# Patient Record
Sex: FEMALE | Race: WHITE | NOT HISPANIC OR LATINO
[De-identification: names, ages, dates, MRNs, and addresses within clinical notes are randomized per-mention and may not be internally consistent; named-entity substitution may affect disease eponyms.]

---

## 2017-09-27 PROBLEM — Z00.00 ENCOUNTER FOR PREVENTIVE HEALTH EXAMINATION: Status: ACTIVE | Noted: 2017-09-27

## 2022-10-14 ENCOUNTER — APPOINTMENT (OUTPATIENT)
Dept: ULTRASOUND IMAGING | Facility: CLINIC | Age: 52
End: 2022-10-14

## 2022-10-14 ENCOUNTER — APPOINTMENT (OUTPATIENT)
Dept: MAMMOGRAPHY | Facility: CLINIC | Age: 52
End: 2022-10-14

## 2022-10-31 ENCOUNTER — APPOINTMENT (OUTPATIENT)
Dept: ORTHOPEDIC SURGERY | Facility: CLINIC | Age: 52
End: 2022-10-31

## 2022-10-31 VITALS — HEIGHT: 67 IN | WEIGHT: 180 LBS | BODY MASS INDEX: 28.25 KG/M2

## 2022-10-31 DIAGNOSIS — M06.9 RHEUMATOID ARTHRITIS, UNSPECIFIED: ICD-10-CM

## 2022-10-31 DIAGNOSIS — Z78.9 OTHER SPECIFIED HEALTH STATUS: ICD-10-CM

## 2022-10-31 PROCEDURE — 73110 X-RAY EXAM OF WRIST: CPT | Mod: LT

## 2022-10-31 PROCEDURE — 99203 OFFICE O/P NEW LOW 30 MIN: CPT | Mod: 25

## 2022-10-31 PROCEDURE — 20550 NJX 1 TENDON SHEATH/LIGAMENT: CPT | Mod: LT

## 2022-10-31 NOTE — HISTORY OF PRESENT ILLNESS
[10] : 10 [9] : 9 [Sharp] : sharp [Constant] : constant [Nothing helps with pain getting better] : Nothing helps with pain getting better [de-identified] : 51yo RHD F with left dorsal and ulnar-sided wrist pain for the past month with no injury. 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 She was seen for this issue by her rheumatologist 2 weeks ago and was given a CSI in the dorsal wrist that did not provide any relief. She has tried a brace she order from [employer], but it seemed to aggravate her symptoms. Pain is affecting her sleep. 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  [] : no [FreeTextEntry1] : left wrist  [FreeTextEntry3] : 1 month  [FreeTextEntry5] : no injury has occurred  [de-identified] : 2009 lt thumb sx

## 2022-10-31 NOTE — PROCEDURE
[Tendon Sheath] : tendon sheath [Left] : of the left [Pain] : pain [Inflammation] : inflammation [Alcohol] : alcohol [Ethyl Chloride sprayed topically] : ethyl chloride sprayed topically [Sterile technique used] : sterile technique used [___ cc    1%] : Lidocaine ~Vcc of 1%  [___ cc    10mg] : Triamcinolone (Kenalog) ~Vcc of 10 mg  [] : Patient tolerated procedure well [Call if redness, pain or fever occur] : call if redness, pain or fever occur [Apply ice for 15min out of every hour for the next 12-24 hours as tolerated] : apply ice for 15 minutes out of every hour for the next 12-24 hours as tolerated [Patient was advised to rest the joint(s) for ____ days] : patient was advised to rest the joint(s) for [unfilled] days [Previous OTC use and PT nontherapeutic] : patient has tried OTC's including aspirin, Ibuprofen, Aleve, etc or prescription NSAIDS, and/or exercises at home and/or physical therapy without satisfactory response [Risks, benefits, alternatives discussed / Verbal consent obtained] : the risks benefits, and alternatives have been discussed, and verbal consent was obtained [de-identified] : AUGUSTU

## 2022-10-31 NOTE — PHYSICAL EXAM
[Left] : left hand [Dorsal Wrist] : dorsal wrist [TFCC] : TFCC [UJ] : UJ [] : good capillary refill in all fingers [de-identified] : ECU tenderness  [de-identified] : Pain with ECU activation

## 2022-11-28 ENCOUNTER — APPOINTMENT (OUTPATIENT)
Dept: ORTHOPEDIC SURGERY | Facility: CLINIC | Age: 52
End: 2022-11-28

## 2022-11-28 VITALS — WEIGHT: 180 LBS | BODY MASS INDEX: 28.25 KG/M2 | HEIGHT: 67 IN

## 2022-11-28 DIAGNOSIS — M77.8 OTHER ENTHESOPATHIES, NOT ELSEWHERE CLASSIFIED: ICD-10-CM

## 2022-11-28 DIAGNOSIS — M25.532 PAIN IN LEFT WRIST: ICD-10-CM

## 2022-11-28 PROCEDURE — 99213 OFFICE O/P EST LOW 20 MIN: CPT

## 2022-11-28 NOTE — HISTORY OF PRESENT ILLNESS
[2] : 2 [6] : 6 [Full time] : Work status: full time [de-identified] : 52 year old female followed for LEFT ECU tendonitis.  She is 1 months s/p injection and reports the injection was very helpful, but that she has episodes of severe shooting pain on the ulnar aspect of her wrist.  She has pain when playing the piano.   [de-identified] : none

## 2022-11-28 NOTE — DISCUSSION/SUMMARY
[Medication Risks Reviewed] : Medication risks reviewed [de-identified] : Considering injection in DRUJ.  She will assess her status over the next 6 weeks.  WIll reassess DRUJ vs ECU on her return.  Wean from the splint.

## 2022-11-28 NOTE — PHYSICAL EXAM
[Left] : left hand [Dorsal Wrist] : dorsal wrist [TFCC] : TFCC [UJ] : UJ [] : good capillary refill in all fingers [de-identified] : ECU tenderness is less.  DRUJ tender.  No pain with DRUJ loading [de-identified] : Pain with ECU activation

## 2022-12-10 ENCOUNTER — OFFICE (OUTPATIENT)
Dept: URBAN - METROPOLITAN AREA CLINIC 12 | Facility: CLINIC | Age: 52
Setting detail: OPHTHALMOLOGY
End: 2022-12-10
Payer: COMMERCIAL

## 2022-12-10 DIAGNOSIS — H16.223: ICD-10-CM

## 2022-12-10 DIAGNOSIS — M06.9: ICD-10-CM

## 2022-12-10 DIAGNOSIS — H43.393: ICD-10-CM

## 2022-12-10 DIAGNOSIS — H40.013: ICD-10-CM

## 2022-12-10 PROCEDURE — 92083 EXTENDED VISUAL FIELD XM: CPT | Performed by: OPHTHALMOLOGY

## 2022-12-10 PROCEDURE — 92133 CPTRZD OPH DX IMG PST SGM ON: CPT | Performed by: OPHTHALMOLOGY

## 2022-12-10 PROCEDURE — 92014 COMPRE OPH EXAM EST PT 1/>: CPT | Performed by: OPHTHALMOLOGY

## 2022-12-10 ASSESSMENT — SPHEQUIV_DERIVED
OD_SPHEQUIV: -0.375
OS_SPHEQUIV: -0.5
OD_SPHEQUIV: 0

## 2022-12-10 ASSESSMENT — REFRACTION_MANIFEST
OS_AXIS: 000
OS_AXIS: 115
OD_CYLINDER: -0.25
OD_VA1: 20/20
OD_CYLINDER: SPH
OS_VA1: 20/20
OD_ADD: +1.50
OS_VA1: 20/20
OD_AXIS: 097
OS_CYLINDER: -0.50
OS_ADD: +1.50
OD_AXIS: 000
OD_SPHERE: PLANO
OS_SPHERE: PLANO
OD_SPHERE: -0.25
OD_VA1: 20/20
OS_CYLINDER: SPH
OS_SPHERE: -0.25

## 2022-12-10 ASSESSMENT — KERATOMETRY
OS_K1POWER_DIOPTERS: 42.25
OS_AXISANGLE_DEGREES: 078
OD_AXISANGLE_DEGREES: 092
OD_K1POWER_DIOPTERS: 42.75
OS_K2POWER_DIOPTERS: 43.25
OD_K2POWER_DIOPTERS: 43.25

## 2022-12-10 ASSESSMENT — REFRACTION_CURRENTRX
OS_ADD: +1.50
OS_ADD: +1.25
OD_OVR_VA: 20/
OD_ADD: +1.25
OS_VPRISM_DIRECTION: SV
OD_ADD: +1.50
OS_OVR_VA: 20/
OD_VPRISM_DIRECTION: SV
OS_VPRISM_DIRECTION: SV
OS_OVR_VA: 20/
OD_OVR_VA: 20/
OD_VPRISM_DIRECTION: SV

## 2022-12-10 ASSESSMENT — PACHYMETRY
OS_CT_UM: 586
OD_CT_CORRECTION: -3
OS_CT_CORRECTION: -3
OD_CT_UM: 586

## 2022-12-10 ASSESSMENT — AXIALLENGTH_DERIVED
OD_AL: 23.777
OD_AL: 23.9263
OS_AL: 24.0718

## 2022-12-10 ASSESSMENT — REFRACTION_AUTOREFRACTION
OD_SPHERE: +0.25
OD_AXIS: 077
OS_CYLINDER: -0.50
OD_CYLINDER: -0.50
OS_SPHERE: PLANO
OS_AXIS: 123

## 2022-12-10 ASSESSMENT — TONOMETRY
OD_IOP_MMHG: 18
OS_IOP_MMHG: 20

## 2022-12-10 ASSESSMENT — VISUAL ACUITY
OD_BCVA: 20/20-1
OS_BCVA: 20/20

## 2022-12-10 ASSESSMENT — CONFRONTATIONAL VISUAL FIELD TEST (CVF)
OD_FINDINGS: FULL
OS_FINDINGS: FULL

## 2022-12-10 ASSESSMENT — SUPERFICIAL PUNCTATE KERATITIS (SPK)
OD_SPK: 1+
OS_SPK: T

## 2023-01-09 ENCOUNTER — APPOINTMENT (OUTPATIENT)
Dept: ORTHOPEDIC SURGERY | Facility: CLINIC | Age: 53
End: 2023-01-09

## 2023-01-16 ENCOUNTER — APPOINTMENT (OUTPATIENT)
Dept: ORTHOPEDIC SURGERY | Facility: CLINIC | Age: 53
End: 2023-01-16

## 2023-07-14 ENCOUNTER — OFFICE (OUTPATIENT)
Dept: URBAN - METROPOLITAN AREA CLINIC 12 | Facility: CLINIC | Age: 53
Setting detail: OPHTHALMOLOGY
End: 2023-07-14
Payer: COMMERCIAL

## 2023-07-14 DIAGNOSIS — H16.223: ICD-10-CM

## 2023-07-14 DIAGNOSIS — H40.013: ICD-10-CM

## 2023-07-14 DIAGNOSIS — H35.033: ICD-10-CM

## 2023-07-14 DIAGNOSIS — H43.393: ICD-10-CM

## 2023-07-14 DIAGNOSIS — H35.61: ICD-10-CM

## 2023-07-14 DIAGNOSIS — M06.9: ICD-10-CM

## 2023-07-14 PROCEDURE — 92250 FUNDUS PHOTOGRAPHY W/I&R: CPT | Performed by: OPHTHALMOLOGY

## 2023-07-14 PROCEDURE — 92014 COMPRE OPH EXAM EST PT 1/>: CPT | Performed by: OPHTHALMOLOGY

## 2023-07-14 ASSESSMENT — REFRACTION_CURRENTRX
OS_VPRISM_DIRECTION: SV
OS_VPRISM_DIRECTION: SV
OD_OVR_VA: 20/
OS_ADD: +1.50
OS_OVR_VA: 20/
OD_VPRISM_DIRECTION: SV
OD_VPRISM_DIRECTION: SV
OD_ADD: +1.25
OD_ADD: +1.50
OD_OVR_VA: 20/
OS_OVR_VA: 20/
OS_ADD: +1.25

## 2023-07-14 ASSESSMENT — VISUAL ACUITY
OD_BCVA: 20/20
OS_BCVA: 20/20

## 2023-07-14 ASSESSMENT — REFRACTION_MANIFEST
OD_CYLINDER: -0.25
OS_SPHERE: -0.25
OS_VA1: 20/20
OD_SPHERE: -0.25
OS_AXIS: 000
OD_VA1: 20/20
OD_ADD: +1.50
OD_VA1: 20/20
OD_AXIS: 000
OS_CYLINDER: SPH
OD_SPHERE: PLANO
OD_CYLINDER: SPH
OS_CYLINDER: -0.50
OS_VA1: 20/20
OS_SPHERE: PLANO
OS_ADD: +1.50
OS_AXIS: 115
OD_AXIS: 097

## 2023-07-14 ASSESSMENT — PACHYMETRY
OD_CT_CORRECTION: -3
OD_CT_UM: 586
OS_CT_UM: 586
OS_CT_CORRECTION: -3

## 2023-07-14 ASSESSMENT — KERATOMETRY
OD_K1POWER_DIOPTERS: 42.75
OS_K1POWER_DIOPTERS: 42.50
OD_K2POWER_DIOPTERS: 43.25
OS_AXISANGLE_DEGREES: 084
OS_K2POWER_DIOPTERS: 43.00
OD_AXISANGLE_DEGREES: 090

## 2023-07-14 ASSESSMENT — AXIALLENGTH_DERIVED
OS_AL: 24.0718
OD_AL: 23.6785
OD_AL: 23.9263

## 2023-07-14 ASSESSMENT — CONFRONTATIONAL VISUAL FIELD TEST (CVF)
OD_FINDINGS: FULL
OS_FINDINGS: FULL

## 2023-07-14 ASSESSMENT — REFRACTION_AUTOREFRACTION
OS_SPHERE: +0.50-
OS_AXIS: 111
OD_CYLINDER: -0.50
OS_CYLINDER: -0.50
OD_AXIS: 095
OD_SPHERE: +0.50

## 2023-07-14 ASSESSMENT — SPHEQUIV_DERIVED
OS_SPHEQUIV: -0.5
OD_SPHEQUIV: -0.375
OD_SPHEQUIV: 0.25

## 2023-07-14 ASSESSMENT — SUPERFICIAL PUNCTATE KERATITIS (SPK)
OS_SPK: T
OD_SPK: 1+

## 2023-07-14 ASSESSMENT — TONOMETRY
OS_IOP_MMHG: 16
OD_IOP_MMHG: 13

## 2023-08-30 ENCOUNTER — APPOINTMENT (OUTPATIENT)
Dept: ORTHOPEDIC SURGERY | Facility: CLINIC | Age: 53
End: 2023-08-30
Payer: COMMERCIAL

## 2023-08-30 DIAGNOSIS — Z78.9 OTHER SPECIFIED HEALTH STATUS: ICD-10-CM

## 2023-08-30 PROCEDURE — 73650 X-RAY EXAM OF HEEL: CPT | Mod: LT

## 2023-08-30 PROCEDURE — 99213 OFFICE O/P EST LOW 20 MIN: CPT | Mod: 25

## 2023-08-30 PROCEDURE — 99203 OFFICE O/P NEW LOW 30 MIN: CPT | Mod: 25

## 2023-08-30 PROCEDURE — J3490M: CUSTOM

## 2023-08-30 PROCEDURE — 20551 NJX 1 TENDON ORIGIN/INSJ: CPT | Mod: LT

## 2023-08-30 PROCEDURE — L4397: CPT | Mod: LT

## 2023-08-30 NOTE — PROCEDURE
[Tendon Origin] : tendon origin [Left] : of the left [Plantar Fascia] : plantar fascia [Pain] : pain [Inflammation] : inflammation [Alcohol] : alcohol [Ethyl Chloride sprayed topically] : ethyl chloride sprayed topically [Sterile technique used] : sterile technique used [Call if redness, pain or fever occur] : call if redness, pain or fever occur [Apply ice for 15min out of every hour for the next 12-24 hours as tolerated] : apply ice for 15 minutes out of every hour for the next 12-24 hours as tolerated [Patient was advised to rest the joint(s) for ____ days] : patient was advised to rest the joint(s) for [unfilled] days [Previous OTC use and PT nontherapeutic] : patient has tried OTC's including aspirin, Ibuprofen, Aleve, etc or prescription NSAIDS, and/or exercises at home and/or physical therapy without satisfactory response [Patient had decreased mobility in the joint] : patient had decreased mobility in the joint [Risks, benefits, alternatives discussed / Verbal consent obtained] : the risks benefits, and alternatives have been discussed, and verbal consent was obtained [Precise injection in area of tear] : precise injection in area of tear [All ultrasound images have been permanently captured and stored accordingly in our picture archiving and communication system] : All ultrasound images have been permanently captured and stored accordingly in our picture archiving and communication system [Visualization of the needle and placement of injection was performed without complication] : visualization of the needle and placement of injection was performed without complication [Isolate area of inflammation] : isolate area of inflammation [Plantar fascia thickness (cm): ____] : plantar fascia thickness is [unfilled] cm [___ cc    6mg] :  Betamethasone (Celestone) ~Vcc of 6mg [___ cc    1%] : Lidocaine ~Vcc of 1%  [___ cc    0.5%] : Bupivacaine (Marcaine) ~Vcc of 0.5%

## 2023-08-30 NOTE — PHYSICAL EXAM
[Left] : left foot and ankle [5___] : eversion 5[unfilled]/5 [2+] : posterior tibialis pulse: 2+ [] : patient ambulates without assistive device [de-identified] : pain with heel walking [de-identified] : plantar flexion 20 degrees [TWNoteComboBox7] : dorsiflexion 15 degrees

## 2023-08-30 NOTE — HISTORY OF PRESENT ILLNESS
[Localized] : localized [de-identified] : 8-30-23- 6 month history of worsening left heel pain and ambulating with a limp. Worse with running she has not been able to train for upcoming event. symptoms worse with start up and morning. she has not been able to take nsaids due to recent elevation of renal and hepatic tests. she has done ice, stretching and worn night boot and voltaren gel with mild help.   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

## 2023-08-30 NOTE — IMAGING
[Left] : left calcaneus [Weight -] : weightbearing [There are no fractures, subluxations or dislocations. No significant abnormalities are seen] : There are no fractures, subluxations or dislocations. No significant abnormalities are seen

## 2023-09-27 ENCOUNTER — APPOINTMENT (OUTPATIENT)
Dept: ORTHOPEDIC SURGERY | Facility: CLINIC | Age: 53
End: 2023-09-27

## 2023-10-04 ENCOUNTER — APPOINTMENT (OUTPATIENT)
Dept: ORTHOPEDIC SURGERY | Facility: CLINIC | Age: 53
End: 2023-10-04
Payer: COMMERCIAL

## 2023-10-04 PROCEDURE — L4360 PNEUMAT WALKING BOOT PRE CST: CPT | Mod: LT

## 2023-10-04 PROCEDURE — 99213 OFFICE O/P EST LOW 20 MIN: CPT

## 2023-10-09 ENCOUNTER — APPOINTMENT (OUTPATIENT)
Dept: MRI IMAGING | Facility: CLINIC | Age: 53
End: 2023-10-09
Payer: COMMERCIAL

## 2023-10-09 PROCEDURE — 73721 MRI JNT OF LWR EXTRE W/O DYE: CPT | Mod: LT

## 2023-10-18 ENCOUNTER — APPOINTMENT (OUTPATIENT)
Dept: ORTHOPEDIC SURGERY | Facility: CLINIC | Age: 53
End: 2023-10-18
Payer: COMMERCIAL

## 2023-10-18 ENCOUNTER — TRANSCRIPTION ENCOUNTER (OUTPATIENT)
Age: 53
End: 2023-10-18

## 2023-10-18 VITALS — WEIGHT: 180 LBS | HEIGHT: 67 IN | BODY MASS INDEX: 28.25 KG/M2

## 2023-10-18 PROCEDURE — 99213 OFFICE O/P EST LOW 20 MIN: CPT

## 2023-11-29 ENCOUNTER — APPOINTMENT (OUTPATIENT)
Dept: ORTHOPEDIC SURGERY | Facility: CLINIC | Age: 53
End: 2023-11-29
Payer: COMMERCIAL

## 2023-11-29 VITALS — WEIGHT: 180 LBS | BODY MASS INDEX: 28.25 KG/M2 | HEIGHT: 67 IN

## 2023-11-29 PROCEDURE — 99214 OFFICE O/P EST MOD 30 MIN: CPT

## 2023-12-07 RX ORDER — OXYCODONE AND ACETAMINOPHEN 5; 325 MG/1; MG/1
5-325 TABLET ORAL
Qty: 20 | Refills: 0 | Status: ACTIVE | COMMUNITY
Start: 2023-12-07 | End: 1900-01-01

## 2023-12-11 ENCOUNTER — APPOINTMENT (OUTPATIENT)
Age: 53
End: 2023-12-11
Payer: COMMERCIAL

## 2023-12-11 PROCEDURE — 29515 APPLICATION SHORT LEG SPLINT: CPT | Mod: 59,LT

## 2023-12-11 PROCEDURE — 28035 DECOMPRESSION OF TIBIA NERVE: CPT | Mod: 59,LT

## 2023-12-11 PROCEDURE — 28060 PARTIAL REMOVAL FOOT FASCIA: CPT | Mod: LT

## 2023-12-11 PROCEDURE — 64450 NJX AA&/STRD OTHER PN/BRANCH: CPT | Mod: 59,LT

## 2023-12-21 ENCOUNTER — APPOINTMENT (OUTPATIENT)
Dept: ORTHOPEDIC SURGERY | Facility: CLINIC | Age: 53
End: 2023-12-21
Payer: COMMERCIAL

## 2023-12-21 PROCEDURE — 99024 POSTOP FOLLOW-UP VISIT: CPT

## 2023-12-21 PROCEDURE — L4361: CPT | Mod: LT

## 2023-12-21 NOTE — HISTORY OF PRESENT ILLNESS
[de-identified] : 12/21/2023:  s/p L tarsal tunnel release; plantar fascia release. nwb. Patient denies fevers, chills, or drainage.

## 2023-12-21 NOTE — PHYSICAL EXAM
[Left] : left foot and ankle [5___] : plantar flexion 5[unfilled]/5 [2+] : dorsalis pedis pulse: 2+ [] : ambulation with crutches

## 2024-01-03 ENCOUNTER — APPOINTMENT (OUTPATIENT)
Dept: ORTHOPEDIC SURGERY | Facility: CLINIC | Age: 54
End: 2024-01-03
Payer: COMMERCIAL

## 2024-01-03 VITALS — HEIGHT: 67 IN | BODY MASS INDEX: 28.25 KG/M2 | WEIGHT: 180 LBS

## 2024-01-03 PROCEDURE — 99024 POSTOP FOLLOW-UP VISIT: CPT

## 2024-01-03 NOTE — REVIEW OF SYSTEMS
[Joint Pain] : joint pain [Joint Swelling] : joint swelling [Negative] : Heme/Lymph [FreeTextEntry9] : achilles

## 2024-01-03 NOTE — PHYSICAL EXAM
[Left] : left foot and ankle [2+] : dorsalis pedis pulse: 2+ [] : Sensation present to light touch in all distributions [de-identified] : pain with heel walking

## 2024-01-03 NOTE — DISCUSSION/SUMMARY
[Surgical risks reviewed] : Surgical risks reviewed [de-identified] : sutures removed and steris placed.  Advance to WBAT in cam boot./ arch support

## 2024-01-10 ENCOUNTER — APPOINTMENT (OUTPATIENT)
Dept: ORTHOPEDIC SURGERY | Facility: CLINIC | Age: 54
End: 2024-01-10

## 2024-01-13 ENCOUNTER — OFFICE (OUTPATIENT)
Dept: URBAN - METROPOLITAN AREA CLINIC 12 | Facility: CLINIC | Age: 54
Setting detail: OPHTHALMOLOGY
End: 2024-01-13
Payer: COMMERCIAL

## 2024-01-13 DIAGNOSIS — H35.033: ICD-10-CM

## 2024-01-13 DIAGNOSIS — M06.9: ICD-10-CM

## 2024-01-13 DIAGNOSIS — H40.013: ICD-10-CM

## 2024-01-13 DIAGNOSIS — H16.223: ICD-10-CM

## 2024-01-13 DIAGNOSIS — H43.393: ICD-10-CM

## 2024-01-13 DIAGNOSIS — H35.61: ICD-10-CM

## 2024-01-13 PROCEDURE — 92083 EXTENDED VISUAL FIELD XM: CPT | Performed by: OPHTHALMOLOGY

## 2024-01-13 PROCEDURE — 92133 CPTRZD OPH DX IMG PST SGM ON: CPT | Performed by: OPHTHALMOLOGY

## 2024-01-13 PROCEDURE — 99213 OFFICE O/P EST LOW 20 MIN: CPT | Performed by: OPHTHALMOLOGY

## 2024-01-13 ASSESSMENT — REFRACTION_CURRENTRX
OD_ADD: +1.50
OS_OVR_VA: 20/
OS_VPRISM_DIRECTION: SV
OD_VPRISM_DIRECTION: SV
OS_VPRISM_DIRECTION: SV
OS_ADD: +1.50
OD_OVR_VA: 20/
OS_OVR_VA: 20/
OD_ADD: +1.25
OS_ADD: +1.25
OD_VPRISM_DIRECTION: SV
OD_OVR_VA: 20/

## 2024-01-13 ASSESSMENT — CONFRONTATIONAL VISUAL FIELD TEST (CVF)
OS_FINDINGS: FULL
OD_FINDINGS: FULL

## 2024-01-13 ASSESSMENT — REFRACTION_AUTOREFRACTION
OS_AXIS: 118
OD_SPHERE: +0.50
OD_AXIS: 093
OS_CYLINDER: -0.25
OS_SPHERE: +0.25
OD_CYLINDER: -0.50

## 2024-01-13 ASSESSMENT — REFRACTION_MANIFEST
OS_CYLINDER: -0.50
OD_AXIS: 097
OS_CYLINDER: SPH
OS_SPHERE: -0.25
OD_VA1: 20/20
OS_VA1: 20/20
OD_CYLINDER: SPH
OS_AXIS: 115
OD_ADD: +1.50
OD_SPHERE: PLANO
OS_ADD: +1.50
OS_SPHERE: PLANO
OD_CYLINDER: -0.25
OD_VA1: 20/20
OS_VA1: 20/20
OD_AXIS: 000
OS_AXIS: 000
OD_SPHERE: -0.25

## 2024-01-13 ASSESSMENT — SPHEQUIV_DERIVED
OS_SPHEQUIV: 0.125
OD_SPHEQUIV: -0.375
OD_SPHEQUIV: 0.25
OS_SPHEQUIV: -0.5

## 2024-01-13 ASSESSMENT — SUPERFICIAL PUNCTATE KERATITIS (SPK)
OD_SPK: 1+
OS_SPK: T

## 2024-01-31 ENCOUNTER — APPOINTMENT (OUTPATIENT)
Dept: ORTHOPEDIC SURGERY | Facility: CLINIC | Age: 54
End: 2024-01-31
Payer: COMMERCIAL

## 2024-01-31 VITALS — HEIGHT: 67 IN | WEIGHT: 180 LBS | BODY MASS INDEX: 28.25 KG/M2

## 2024-01-31 PROCEDURE — 99024 POSTOP FOLLOW-UP VISIT: CPT

## 2024-01-31 NOTE — PHYSICAL EXAM
[Left] : left foot and ankle [2+] : dorsalis pedis pulse: 2+ [] : ambulation with crutches [de-identified] : pain with heel walking

## 2024-01-31 NOTE — DISCUSSION/SUMMARY
[Surgical risks reviewed] : Surgical risks reviewed [de-identified] : switch to sneaker/arch support HEP/PT

## 2024-01-31 NOTE — HISTORY OF PRESENT ILLNESS
[Gradual] : gradual [4] : 4 [3] : 3 [Radiating] : radiating [Stabbing] : stabbing [Constant] : constant [Work] : work [Sleep] : sleep [Meds] : meds [Ice] : ice [Walking] : walking [Full time] : Work status: full time [de-identified] : 8-30-23- 6 month history of worsening left heel pain and ambulating with a limp. Worse with running she has not been able to train for upcoming event. symptoms worse with start up and morning. she has not been able to take nsaids due to recent elevation of renal and hepatic tests. she has done ice, stretching and worn night boot and voltaren gel with mild help.   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  Transient relief p- injection.  Using night splint 11/29/23: f/u L heel. In cam boot. She still has pain.   12/11/23: left plantar fascia and tarsal tunnel release  1/3/24: f/u L foot. NWB in cam boot. Doing well.  1/31/24  f/u L foot  wb in cam boot HEP, did not start PT yet [] : no [FreeTextEntry1] : left foot [FreeTextEntry5] : feels a bump for months  [FreeTextEntry7] : ankle [FreeTextEntry9] : short  cam boot , own exercises  [de-identified] : 12-11-23 [de-identified] : 12-11-23

## 2024-03-20 ENCOUNTER — APPOINTMENT (OUTPATIENT)
Dept: ORTHOPEDIC SURGERY | Facility: CLINIC | Age: 54
End: 2024-03-20
Payer: COMMERCIAL

## 2024-03-20 VITALS — BODY MASS INDEX: 28.25 KG/M2 | HEIGHT: 67 IN | WEIGHT: 180 LBS

## 2024-03-20 PROCEDURE — 99213 OFFICE O/P EST LOW 20 MIN: CPT

## 2024-03-20 NOTE — REVIEW OF SYSTEMS
[Joint Pain] : joint pain [Joint Swelling] : joint swelling [Negative] : Endocrine [FreeTextEntry9] : achilles

## 2024-03-20 NOTE — HISTORY OF PRESENT ILLNESS
[Gradual] : gradual [Radiating] : radiating [Work] : work [Meds] : meds [Sleep] : sleep [Walking] : walking [Ice] : ice [Full time] : Work status: full time [7] : 7 [0] : 0 [Dull/Aching] : dull/aching [Shooting] : shooting [Stabbing] : stabbing [Massage] : massage [Frequent] : frequent [Physical therapy] : physical therapy [de-identified] : 8-30-23- 6 month history of worsening left heel pain and ambulating with a limp. Worse with running she has not been able to train for upcoming event. symptoms worse with start up and morning. she has not been able to take nsaids due to recent elevation of renal and hepatic tests. she has done ice, stretching and worn night boot and voltaren gel with mild help.   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  Transient relief p- injection.  Using night splint 11/29/23: f/u L heel. In cam boot. She still has pain.   12/11/23: left plantar fascia and tarsal tunnel release  1/3/24: f/u L foot. NWB in cam boot. Doing well.  1/31/24  f/u L foot  wb in cam boot HEP, did not start PT yet 3/20/24  f/u L foot.  Biked 100 miles, walked 4 miles  PT  Home TENS [] : no [FreeTextEntry1] : left foot and ankle [FreeTextEntry5] : feels a bump for months  [FreeTextEntry7] : ankle [FreeTextEntry9] : own exercises, eletric stimulation [de-identified] : 12-11-23 [de-identified] : 12-11-23

## 2024-03-20 NOTE — PHYSICAL EXAM
[Left] : left foot and ankle [2+] : dorsalis pedis pulse: 2+ [] : patient ambulates without assistive device [de-identified] : pain with heel walking [TWNoteComboBox7] : dorsiflexion 15 degrees

## 2024-05-15 ENCOUNTER — APPOINTMENT (OUTPATIENT)
Dept: ORTHOPEDIC SURGERY | Facility: CLINIC | Age: 54
End: 2024-05-15
Payer: COMMERCIAL

## 2024-05-15 VITALS — HEIGHT: 67 IN | WEIGHT: 180 LBS | BODY MASS INDEX: 28.25 KG/M2

## 2024-05-15 DIAGNOSIS — M72.2 PLANTAR FASCIAL FIBROMATOSIS: ICD-10-CM

## 2024-05-15 PROCEDURE — 99212 OFFICE O/P EST SF 10 MIN: CPT

## 2024-05-15 NOTE — PHYSICAL EXAM
[Left] : left foot and ankle [2+] : dorsalis pedis pulse: 2+ [] : non-antalgic [de-identified] : pain with heel walking [TWNoteComboBox7] : dorsiflexion 15 degrees

## 2024-05-15 NOTE — HISTORY OF PRESENT ILLNESS
[Gradual] : gradual [7] : 7 [0] : 0 [Dull/Aching] : dull/aching [Radiating] : radiating [Shooting] : shooting [Stabbing] : stabbing [Intermittent] : intermittent [Work] : work [Sleep] : sleep [Meds] : meds [Ice] : ice [Massage] : massage [Physical therapy] : physical therapy [Walking] : walking [Full time] : Work status: full time [de-identified] : 8-30-23- 6 month history of worsening left heel pain and ambulating with a limp. Worse with running she has not been able to train for upcoming event. symptoms worse with start up and morning. she has not been able to take nsaids due to recent elevation of renal and hepatic tests. she has done ice, stretching and worn night boot and voltaren gel with mild help.   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  Transient relief p- injection.  Using night splint 11/29/23: f/u L heel. In cam boot. She still has pain.   12/11/23: left plantar fascia and tarsal tunnel release  1/3/24: f/u L foot. NWB in cam boot. Doing well.  1/31/24  f/u L foot  wb in cam boot HEP, did not start PT yet 3/20/24  f/u L foot.  Biked 100 miles, walked 4 miles  PT  Home TENS 5/15/24 f/u L foot  improved  Finished PT  Hiked [employer] [] : Post Surgical Visit: no [FreeTextEntry1] : left foot and ankle [FreeTextEntry7] : ankle [FreeTextEntry9] : own exercises, TENS unit at home  [de-identified] : over use towards arch to the ankle  [de-identified] : 12-11-23 [de-identified] : 12-11-23

## 2024-06-06 ENCOUNTER — APPOINTMENT (OUTPATIENT)
Dept: ORTHOPEDIC SURGERY | Facility: CLINIC | Age: 54
End: 2024-06-06
Payer: COMMERCIAL

## 2024-06-06 VITALS — BODY MASS INDEX: 28.25 KG/M2 | WEIGHT: 180 LBS | HEIGHT: 67 IN

## 2024-06-06 DIAGNOSIS — M18.11 UNILATERAL PRIMARY OSTEOARTHRITIS OF FIRST CARPOMETACARPAL JOINT, RIGHT HAND: ICD-10-CM

## 2024-06-06 PROCEDURE — 99214 OFFICE O/P EST MOD 30 MIN: CPT | Mod: 25

## 2024-06-06 PROCEDURE — 73110 X-RAY EXAM OF WRIST: CPT | Mod: RT

## 2024-06-06 PROCEDURE — 20605 DRAIN/INJ JOINT/BURSA W/O US: CPT | Mod: RT

## 2024-06-06 NOTE — PROCEDURE
[Right] : of the right [CMC Joint] : CMC joint [Alcohol] : alcohol [Ethyl Chloride sprayed topically] : ethyl chloride sprayed topically [Sterile technique used] : sterile technique used [___ cc    1%] : Lidocaine ~Vcc of 1%  [___ cc    10mg] : Triamcinolone (Kenalog) ~Vcc of 10 mg

## 2024-06-25 NOTE — DISCUSSION/SUMMARY
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  Patient was educated and informed on their condition along with the expected outcomes.  Proceeded with CSI today.  Tolerated well.  XRs reviewed with pt today.  Splint Rx'd today.  All questions answered.  RTO if unable to control pain.

## 2024-06-25 NOTE — HISTORY OF PRESENT ILLNESS
[10] : 10 [3] : 3 [Radiating] : radiating [Stabbing] : stabbing [Intermittent] : intermittent [Rest] : rest [Meds] : meds [Full time] : Work status: full time [de-identified] : 53 Y F presents for an initial evaluation. Pain in RT CMC with swelling & tenderness. Difficulty to hold pen. No specific injury.  PMHx rheumatoid arthritis.  [] : no [FreeTextEntry1] : right hand [FreeTextEntry5] : Patient states for about 3 months she had been having right hand stiffness and pain. h/o RA [FreeTextEntry6] : stiffness [FreeTextEntry7] : right wrist [FreeTextEntry8] : riding bicycle  [FreeTextEntry9] : Jose lainez (naproxen) [de-identified] : using in general, stretching out, grabbing, bicycling  [de-identified] : none [de-identified] :

## 2024-06-25 NOTE — DATA REVIEWED
[Report was reviewed and noted in the chart] : The report was reviewed and noted in the chart [I independently reviewed and interpreted images and report] : I independently reviewed and interpreted images and report [FreeTextEntry1] : 2 V RT wrist in house XR-

## 2024-07-13 ENCOUNTER — OFFICE (OUTPATIENT)
Dept: URBAN - METROPOLITAN AREA CLINIC 12 | Facility: CLINIC | Age: 54
Setting detail: OPHTHALMOLOGY
End: 2024-07-13
Payer: COMMERCIAL

## 2024-07-13 DIAGNOSIS — H43.393: ICD-10-CM

## 2024-07-13 DIAGNOSIS — M06.9: ICD-10-CM

## 2024-07-13 DIAGNOSIS — H16.223: ICD-10-CM

## 2024-07-13 DIAGNOSIS — H40.013: ICD-10-CM

## 2024-07-13 DIAGNOSIS — H35.61: ICD-10-CM

## 2024-07-13 DIAGNOSIS — H35.033: ICD-10-CM

## 2024-07-13 PROCEDURE — 92250 FUNDUS PHOTOGRAPHY W/I&R: CPT | Performed by: OPHTHALMOLOGY

## 2024-07-13 PROCEDURE — 92014 COMPRE OPH EXAM EST PT 1/>: CPT | Performed by: OPHTHALMOLOGY

## 2024-07-13 PROCEDURE — 92020 GONIOSCOPY: CPT | Performed by: OPHTHALMOLOGY

## 2024-07-13 ASSESSMENT — CONFRONTATIONAL VISUAL FIELD TEST (CVF)
OD_FINDINGS: FULL
OS_FINDINGS: FULL

## 2024-11-07 ENCOUNTER — APPOINTMENT (OUTPATIENT)
Dept: ORTHOPEDIC SURGERY | Facility: CLINIC | Age: 54
End: 2024-11-07
Payer: OTHER MISCELLANEOUS

## 2024-11-07 DIAGNOSIS — S63.641A SPRAIN OF METACARPOPHALANGEAL JOINT OF RIGHT THUMB, INITIAL ENCOUNTER: ICD-10-CM

## 2024-11-07 DIAGNOSIS — M79.644 PAIN IN RIGHT FINGER(S): ICD-10-CM

## 2024-11-07 PROCEDURE — 99213 OFFICE O/P EST LOW 20 MIN: CPT

## 2024-11-07 PROCEDURE — 73140 X-RAY EXAM OF FINGER(S): CPT | Mod: RT

## 2024-11-07 RX ORDER — AMLODIPINE BESYLATE 5 MG/1
5 TABLET ORAL
Refills: 0 | Status: ACTIVE | COMMUNITY

## 2024-12-02 ENCOUNTER — APPOINTMENT (OUTPATIENT)
Dept: ORTHOPEDIC SURGERY | Facility: CLINIC | Age: 54
End: 2024-12-02
Payer: OTHER MISCELLANEOUS

## 2024-12-02 VITALS — WEIGHT: 180 LBS | HEIGHT: 68 IN | BODY MASS INDEX: 27.28 KG/M2

## 2024-12-02 DIAGNOSIS — S63.641A SPRAIN OF METACARPOPHALANGEAL JOINT OF RIGHT THUMB, INITIAL ENCOUNTER: ICD-10-CM

## 2024-12-02 PROCEDURE — 99213 OFFICE O/P EST LOW 20 MIN: CPT

## 2024-12-26 ENCOUNTER — APPOINTMENT (OUTPATIENT)
Dept: ORTHOPEDIC SURGERY | Facility: CLINIC | Age: 54
End: 2024-12-26

## 2024-12-26 VITALS — BODY MASS INDEX: 27.28 KG/M2 | WEIGHT: 180 LBS | HEIGHT: 68 IN

## 2024-12-26 DIAGNOSIS — S63.641A SPRAIN OF METACARPOPHALANGEAL JOINT OF RIGHT THUMB, INITIAL ENCOUNTER: ICD-10-CM

## 2024-12-26 PROCEDURE — 99213 OFFICE O/P EST LOW 20 MIN: CPT

## 2024-12-31 ENCOUNTER — APPOINTMENT (OUTPATIENT)
Dept: MRI IMAGING | Facility: CLINIC | Age: 54
End: 2024-12-31

## 2024-12-31 PROCEDURE — 73221 MRI JOINT UPR EXTREM W/O DYE: CPT | Mod: RT

## 2025-01-09 ENCOUNTER — APPOINTMENT (OUTPATIENT)
Dept: ORTHOPEDIC SURGERY | Facility: CLINIC | Age: 55
End: 2025-01-09
Payer: COMMERCIAL

## 2025-01-09 ENCOUNTER — APPOINTMENT (OUTPATIENT)
Dept: ORTHOPEDIC SURGERY | Facility: CLINIC | Age: 55
End: 2025-01-09

## 2025-01-09 VITALS — HEIGHT: 68 IN | WEIGHT: 185 LBS | BODY MASS INDEX: 28.04 KG/M2

## 2025-01-09 VITALS — BODY MASS INDEX: 28.04 KG/M2 | WEIGHT: 185 LBS | HEIGHT: 68 IN

## 2025-01-09 DIAGNOSIS — S63.641A SPRAIN OF METACARPOPHALANGEAL JOINT OF RIGHT THUMB, INITIAL ENCOUNTER: ICD-10-CM

## 2025-01-09 DIAGNOSIS — M18.11 UNILATERAL PRIMARY OSTEOARTHRITIS OF FIRST CARPOMETACARPAL JOINT, RIGHT HAND: ICD-10-CM

## 2025-01-09 PROCEDURE — ZZZZZ: CPT

## 2025-01-09 PROCEDURE — 20605 DRAIN/INJ JOINT/BURSA W/O US: CPT | Mod: RT

## 2025-02-08 ENCOUNTER — OFFICE (OUTPATIENT)
Dept: URBAN - METROPOLITAN AREA CLINIC 12 | Facility: CLINIC | Age: 55
Setting detail: OPHTHALMOLOGY
End: 2025-02-08
Payer: COMMERCIAL

## 2025-02-08 DIAGNOSIS — H40.013: ICD-10-CM

## 2025-02-08 DIAGNOSIS — H43.393: ICD-10-CM

## 2025-02-08 DIAGNOSIS — H35.033: ICD-10-CM

## 2025-02-08 DIAGNOSIS — H16.223: ICD-10-CM

## 2025-02-08 DIAGNOSIS — M06.9: ICD-10-CM

## 2025-02-08 PROCEDURE — 99213 OFFICE O/P EST LOW 20 MIN: CPT | Performed by: OPHTHALMOLOGY

## 2025-02-08 PROCEDURE — 92083 EXTENDED VISUAL FIELD XM: CPT | Performed by: OPHTHALMOLOGY

## 2025-02-08 PROCEDURE — 92133 CPTRZD OPH DX IMG PST SGM ON: CPT | Performed by: OPHTHALMOLOGY

## 2025-02-08 ASSESSMENT — SUPERFICIAL PUNCTATE KERATITIS (SPK)
OD_SPK: 1+
OS_SPK: T

## 2025-02-08 ASSESSMENT — REFRACTION_MANIFEST
OD_VA1: 20/20
OS_ADD: +2.00
OD_CYLINDER: SPHERE
OD_CYLINDER: SPH
OD_VA1: 20/20
OS_SPHERE: PLANO
OS_AXIS: 000
OD_SPHERE: PLANO
OD_AXIS: 000
OD_ADD: +2.00
OS_VA1: 20/20
OD_SPHERE: PLANO
OS_CYLINDER: -0.25
OS_SPHERE: +0.25
OD_ADD: +1.50
OS_ADD: +1.50
OS_VA1: 20/20
OS_AXIS: 115
OS_CYLINDER: SPH

## 2025-02-08 ASSESSMENT — PACHYMETRY
OS_CT_UM: 586
OD_CT_UM: 586
OD_CT_CORRECTION: -3
OS_CT_CORRECTION: -3

## 2025-02-08 ASSESSMENT — REFRACTION_CURRENTRX
OS_VPRISM_DIRECTION: SV
OD_VPRISM_DIRECTION: SV
OS_OVR_VA: 20/
OS_OVR_VA: 20/
OD_VPRISM_DIRECTION: SV
OS_ADD: +1.50
OD_OVR_VA: 20/
OS_VPRISM_DIRECTION: SV
OD_ADD: +1.50
OS_ADD: +1.25
OD_OVR_VA: 20/
OD_ADD: +1.25

## 2025-02-08 ASSESSMENT — KERATOMETRY
OD_AXISANGLE_DEGREES: 095
OS_AXISANGLE_DEGREES: 091
OD_K2POWER_DIOPTERS: 43.50
METHOD_AUTO_MANUAL: AUTO
OD_K1POWER_DIOPTERS: 42.75
OS_K2POWER_DIOPTERS: 43.25
OS_K1POWER_DIOPTERS: 42.25

## 2025-02-08 ASSESSMENT — TONOMETRY
OS_IOP_MMHG: 17
OD_IOP_MMHG: 14

## 2025-02-08 ASSESSMENT — CONFRONTATIONAL VISUAL FIELD TEST (CVF)
OS_FINDINGS: FULL
OD_FINDINGS: FULL

## 2025-02-08 ASSESSMENT — VISUAL ACUITY
OS_BCVA: 20/20
OD_BCVA: 20/20

## 2025-02-08 ASSESSMENT — REFRACTION_AUTOREFRACTION
OD_SPHERE: +0.25
OS_CYLINDER: -0.25
OD_AXIS: 063
OS_SPHERE: +0.50
OD_CYLINDER: -0.25
OS_AXIS: 107

## 2025-02-24 ENCOUNTER — APPOINTMENT (OUTPATIENT)
Dept: ORTHOPEDIC SURGERY | Facility: CLINIC | Age: 55
End: 2025-02-24